# Patient Record
Sex: MALE | Race: WHITE | NOT HISPANIC OR LATINO | URBAN - METROPOLITAN AREA
[De-identification: names, ages, dates, MRNs, and addresses within clinical notes are randomized per-mention and may not be internally consistent; named-entity substitution may affect disease eponyms.]

---

## 2018-03-15 NOTE — PATIENT DISCUSSION
NEEDS LOWER IOP OS, DISCUSSED XEN-45 VS. TRAB. NEEDS SX ASAP. WATS TO GO HOME AND THINK ABOUT IT. PT WILL LET US KNOW.

## 2018-10-20 NOTE — PATIENT DISCUSSION
No signs of infection today from [facility]. Disc pt has severe glaucoma and could be possibly a progression in the visual field loss. Pt reports normal BP and no FOL. No sign of a retinal tear today. Rec eval with [address] on Monday and then poss eval with retina to rule out any vascular issue.

## 2021-09-16 ENCOUNTER — NEW PATIENT COMPREHENSIVE (OUTPATIENT)
Dept: URBAN - METROPOLITAN AREA CLINIC 49 | Facility: CLINIC | Age: 66
End: 2021-09-16

## 2021-09-16 DIAGNOSIS — H43.812: ICD-10-CM

## 2021-09-16 DIAGNOSIS — H25.13: ICD-10-CM

## 2021-09-16 PROCEDURE — 92134 CPTRZ OPH DX IMG PST SGM RTA: CPT

## 2021-09-16 PROCEDURE — 92004 COMPRE OPH EXAM NEW PT 1/>: CPT

## 2021-09-16 PROCEDURE — 92015 DETERMINE REFRACTIVE STATE: CPT

## 2021-09-16 ASSESSMENT — VISUAL ACUITY
OU_CC: J1+
OS_SC: 20/30-2
OS_GLARE: 20/50
OD_GLARE: 20/50
OD_SC: 20/25-1
OD_GLARE: 20/30
OS_GLARE: 20/30

## 2021-09-16 ASSESSMENT — TONOMETRY
OD_IOP_MMHG: 13
OS_IOP_MMHG: 14

## 2022-10-25 ENCOUNTER — ESTABLISHED PATIENT (OUTPATIENT)
Dept: URBAN - METROPOLITAN AREA CLINIC 53 | Facility: CLINIC | Age: 67
End: 2022-10-25

## 2022-10-25 DIAGNOSIS — H25.811: ICD-10-CM

## 2022-10-25 DIAGNOSIS — H25.12: ICD-10-CM

## 2022-10-25 PROCEDURE — 92015 DETERMINE REFRACTIVE STATE: CPT

## 2022-10-25 PROCEDURE — 92014 COMPRE OPH EXAM EST PT 1/>: CPT

## 2022-10-25 ASSESSMENT — VISUAL ACUITY
OD_GLARE: 20/30
OD_CC: 20/20
OS_CC: 20/25-2
OS_GLARE: 20/30
OU_CC: J1 @ 14"
OU_CC: 20/20-1
OS_GLARE: 20/50
OD_GLARE: 20/50

## 2022-10-25 ASSESSMENT — TONOMETRY
OS_IOP_MMHG: 14
OD_IOP_MMHG: 13

## 2022-10-25 NOTE — PATIENT DISCUSSION
Instructed to call immediately if any new distortion, blurring, decreased vision or eye pain.
Recommended observation.
Retinal tear and detachment warning symptoms reviewed and patient instructed to call immediately if increasing floaters, flashes, or decreasing peripheral vision.
VISUALLY SIGNIFICANT/ DEFERS: Informed patient that their cataract is visually significant and that surgery is recommended to improve patient's visual acuity and/or glare symptoms. RBAs of surgery discussed and questions answered. Patient defers surgery at this time. Will continue to monitor regularly for progression. Patient can call to schedule biometry and surgery within the next 6 months if desired.
No

## 2023-10-31 ENCOUNTER — COMPREHENSIVE EXAM (OUTPATIENT)
Dept: URBAN - METROPOLITAN AREA CLINIC 53 | Facility: CLINIC | Age: 68
End: 2023-10-31

## 2023-10-31 DIAGNOSIS — H43.812: ICD-10-CM

## 2023-10-31 DIAGNOSIS — H25.811: ICD-10-CM

## 2023-10-31 DIAGNOSIS — H25.12: ICD-10-CM

## 2023-10-31 DIAGNOSIS — H35.372: ICD-10-CM

## 2023-10-31 DIAGNOSIS — H52.4: ICD-10-CM

## 2023-10-31 DIAGNOSIS — H35.363: ICD-10-CM

## 2023-10-31 PROCEDURE — 92015 DETERMINE REFRACTIVE STATE: CPT

## 2023-10-31 PROCEDURE — 99214 OFFICE O/P EST MOD 30 MIN: CPT

## 2023-10-31 PROCEDURE — 92134 CPTRZ OPH DX IMG PST SGM RTA: CPT

## 2023-10-31 ASSESSMENT — VISUAL ACUITY
OU_CC: J2@16"
OD_CC: 20/20-1
OS_GLARE: 20/20
OD_GLARE: 20/25
OD_GLARE: 20/25
OS_GLARE: 20/20
OS_CC: 20/20-2

## 2023-10-31 ASSESSMENT — TONOMETRY
OD_IOP_MMHG: 15
OS_IOP_MMHG: 15

## 2024-11-05 ENCOUNTER — COMPREHENSIVE EXAM (OUTPATIENT)
Dept: URBAN - METROPOLITAN AREA CLINIC 53 | Facility: CLINIC | Age: 69
End: 2024-11-05

## 2024-11-05 DIAGNOSIS — H25.811: ICD-10-CM

## 2024-11-05 DIAGNOSIS — H25.12: ICD-10-CM

## 2024-11-05 DIAGNOSIS — H43.812: ICD-10-CM

## 2024-11-05 DIAGNOSIS — D31.31: ICD-10-CM

## 2024-11-05 DIAGNOSIS — H35.372: ICD-10-CM

## 2024-11-05 DIAGNOSIS — H52.4: ICD-10-CM

## 2024-11-05 DIAGNOSIS — H35.363: ICD-10-CM

## 2024-11-05 PROCEDURE — 92015 DETERMINE REFRACTIVE STATE: CPT

## 2024-11-05 PROCEDURE — 99214 OFFICE O/P EST MOD 30 MIN: CPT

## 2024-11-05 PROCEDURE — 92134 CPTRZ OPH DX IMG PST SGM RTA: CPT

## 2025-02-20 ENCOUNTER — EMERGENCY VISIT (OUTPATIENT)
Age: 70
End: 2025-02-20

## 2025-02-20 DIAGNOSIS — H01.004: ICD-10-CM

## 2025-02-20 DIAGNOSIS — D23.111: ICD-10-CM

## 2025-02-20 DIAGNOSIS — H01.001: ICD-10-CM

## 2025-02-20 DIAGNOSIS — T15.12XA: ICD-10-CM

## 2025-02-20 PROCEDURE — 99213 OFFICE O/P EST LOW 20 MIN: CPT
